# Patient Record
Sex: FEMALE | Race: OTHER | Employment: FULL TIME | URBAN - METROPOLITAN AREA
[De-identification: names, ages, dates, MRNs, and addresses within clinical notes are randomized per-mention and may not be internally consistent; named-entity substitution may affect disease eponyms.]

---

## 2024-10-14 ENCOUNTER — OFFICE VISIT (OUTPATIENT)
Dept: URGENT CARE | Facility: CLINIC | Age: 49
End: 2024-10-14
Payer: COMMERCIAL

## 2024-10-14 VITALS
SYSTOLIC BLOOD PRESSURE: 158 MMHG | RESPIRATION RATE: 18 BRPM | HEART RATE: 67 BPM | WEIGHT: 125 LBS | OXYGEN SATURATION: 100 % | BODY MASS INDEX: 21.34 KG/M2 | HEIGHT: 64 IN | DIASTOLIC BLOOD PRESSURE: 92 MMHG | TEMPERATURE: 97.9 F

## 2024-10-14 DIAGNOSIS — R42 DIZZINESS AND GIDDINESS: ICD-10-CM

## 2024-10-14 DIAGNOSIS — R20.2 PARESTHESIAS: ICD-10-CM

## 2024-10-14 DIAGNOSIS — R07.89 CHEST PRESSURE: Primary | ICD-10-CM

## 2024-10-14 LAB — GLUCOSE SERPL-MCNC: 102 MG/DL (ref 65–140)

## 2024-10-14 PROCEDURE — S9083 URGENT CARE CENTER GLOBAL: HCPCS | Performed by: NURSE PRACTITIONER

## 2024-10-14 PROCEDURE — G0382 LEV 3 HOSP TYPE B ED VISIT: HCPCS | Performed by: NURSE PRACTITIONER

## 2024-10-14 PROCEDURE — 82948 REAGENT STRIP/BLOOD GLUCOSE: CPT | Performed by: NURSE PRACTITIONER

## 2024-10-14 NOTE — PROGRESS NOTES
[facility]        NAME: Adilene Reddy is a 49 y.o. female  : 1975    MRN: [id number]  DATE: 2024  TIME: 11:06 AM    Assessment and Plan   Chest pressure [R07.89]  1. Chest pressure        2. Dizziness and giddiness        3. Paresthesias          Blood sugar in office was 102.  EKG done in office which states normal sinus rhythm with nonspecific ST and T wave abnormalities.  Referred to emergency room for further evaluation and workup.  Patient declines EMS states her son will drive her.  Son is with her in the waiting room.  She will be going to [facility] as it is the closest 1.    Patient Instructions     Follow up with PCP in 3-5 days.  Proceed to  ER if symptoms worsen.    Chief Complaint     Chief Complaint   Patient presents with    Dizziness     Felt weak, lightheaded, pressure in chest. Was fine this morning, then symptoms started at work at around 0800. Had a high blood pressure when checked at work. Tongue, hands and feet felt tingly.         History of Present Illness   Adilene Reddy presents to the clinic c/o    Dizziness (Felt weak, lightheaded, pressure in chest. Was fine this morning, then symptoms started at work at around 0800.  She states she was working on her computer when she felt pressure in her chest she felt tightness in her throat.  And she felt like she was going to pass out so she put her head down between her legs and her symptoms resolved.  They took her into a.m. care where they noted she had a high blood pressure when checked at work. Tongue, hands and feet felt tingly.)  Patient states she always eats breakfast at 915 so this is nothing out of the ordinary for her.  Patient states she continues to feel pressure on and off in her chest.  Continues with numbness and tingling in her hands her feet and her tongue.  Dizziness at times.  She does not have a family doctor.  She states she had something similar in  and was seen in the ER  "and was told her heart was normal at that time.  She states that was related to anxiety however she has been  and she states she does not have any stress right now in her life.  She states work is going well and has a great 18-year-old son who is not causing any problems.  She states she is overall happy.        Review of Systems   Review of Systems   All other systems reviewed and are negative.        Current Medications     No long-term medications on file.       Current Allergies     Allergies as of 10/14/2024    (No Known Allergies)            The following portions of the patient's history were reviewed and updated as appropriate: allergies, current medications, past family history, past medical history, past social history, past surgical history and problem list.    Objective   /92   Pulse 67   Temp 97.9 °F (36.6 °C) (Tympanic)   Resp 18   Ht 5' 4\" (1.626 m)   Wt 56.7 kg (125 lb)   SpO2 100%   BMI 21.46 kg/m²        Physical Exam     Physical Exam  Vitals and nursing note reviewed.   Constitutional:       Appearance: Normal appearance. She is well-developed.   HENT:      Head: Normocephalic and atraumatic.      Right Ear: Hearing, tympanic membrane, ear canal and external ear normal.      Left Ear: Hearing, tympanic membrane, ear canal and external ear normal.      Nose: Nose normal.      Mouth/Throat:      Lips: Pink.      Mouth: Mucous membranes are moist.      Pharynx: Oropharynx is clear.   Eyes:      General: Lids are normal.      Conjunctiva/sclera: Conjunctivae normal.      Pupils: Pupils are equal, round, and reactive to light.   Cardiovascular:      Rate and Rhythm: Normal rate and regular rhythm.      Heart sounds: Normal heart sounds, S1 normal and S2 normal.   Pulmonary:      Effort: Pulmonary effort is normal.      Breath sounds: Normal breath sounds.   Abdominal:      General: Abdomen is flat. Bowel sounds are normal.      Palpations: Abdomen is soft.   Musculoskeletal:       "   General: Normal range of motion.      Cervical back: Full passive range of motion without pain, normal range of motion and neck supple.   Skin:     General: Skin is warm and dry.   Neurological:      General: No focal deficit present.      Mental Status: She is alert and oriented to person, place, and time.   Psychiatric:         Mood and Affect: Mood normal.         Speech: Speech normal.         Behavior: Behavior normal. Behavior is cooperative.         Thought Content: Thought content normal.         Judgment: Judgment normal.

## 2024-10-14 NOTE — PATIENT INSTRUCTIONS
"Proceed to ER -   Patient Education     Chest pain - Discharge instructions   The Basics   Written by the doctors and editors at [facility], [place]   What are discharge instructions? -- Discharge instructions are information about how to take care of yourself after getting medical care for a health problem.  What is chest pain? -- Different things can cause chest pain or discomfort. Many of these are not serious. Having chest pain does not necessarily mean that you are having a heart attack. But chest pain can be a sign of a heart attack.  Doctors use the word \"angina\" for chest pain or discomfort that is caused by a condition called \"coronary artery disease.\" In this disease, fatty deposits build up in the arteries that supply the heart with blood (figure 1). Chest pain can also be caused by a spasm in 1 of these arteries (figure 2).  When 1 of these things happens, the arteries narrow, and the heart muscle does not get enough blood. This causes chest pain or discomfort, especially during physical activity. People with angina often need to take medicines to help prevent or relieve chest pain.  In many cases, chest pain is not related to a heart problem. It can be caused by a lung problem, or something less serious such as muscle pain, heartburn, or anxiety.  How do I care for myself at home? -- Ask the doctor or nurse what you should do when you go home. Make sure that you understand exactly what you need to do to care for yourself. Ask questions if there is anything you do not understand.  You should also:   Know how to take nitroglycerin pills or spray, if your doctor prescribed them:   Rest. Sit or lie down while you take the medicine.   Spray the nitroglycerin under your tongue, or place 1 pill under your tongue and let it melt.   If the pain is not better or is getting worse after 5 minutes, call for emergency help. Then, take 1 more spray or pill under your tongue.   If the pain does not get better after another 3 to 5 " "minutes, take a third spray or pill under your tongue.   Drink a sip of water if your mouth is too dry for the pills to melt.   Carry your nitroglycerin pills or spray with you at all times. Check often to make sure that they are not . Keep your nitroglycerin in the original bottle and at room temperature. The pill should burn or tingle when you put it under your tongue. If it does not, you might need to replace your medicine. You should also replace the nitroglycerin pills or spray 6 to 12 months after you open the bottle.   If you have aspirin at home, chew and swallow a 325 mg tablet. The aspirin should not have an \"enteric coating\" (also called a \"safety coating\") or it will not dissolve fast enough.   Take all of your medicines as instructed, even if you feel well. Check with your doctor before taking any new medicines or supplements.   Keep your blood pressure and cholesterol under control. If you have diabetes, make sure that you know how to manage it.   Quit smoking, if you smoke. Your doctor or nurse can help.   Try to keep a healthy weight. If you have excess body weight, it can help to lose weight. Your doctor or nurse can help you do this in a healthy way.   Eat a healthy diet. Talk with your doctor or nurse about how to add healthy foods to your diet.   Go to \"heart rehab,\" if you doctor recommends it. This is an important part of your care. Share your discharge information with the rehab staff so they can plan a program to help you recover. Let your doctor know if you need help finding a program.  When should I call the doctor? -- Call for emergency help right away (in the [place] and [place], call ) if you think that you might be having a heart attack.  Symptoms of a heart attack can include (figure 3):   Severe chest pain, pressure, or discomfort with:   Breathing trouble, sweating, upset stomach, or cold and clammy skin   Pain in your arms, back, or jaw   Worse pain with activity like walking " up stairs   Fast or irregular heartbeat   Feeling dizzy, faint, or weak  Call your doctor for advice if:   You have a fever of 100.4°F (38°C) or higher, or chills.   You cough up yellow or green mucus.   You are more tired than normal, or have more trouble breathing with activity.  All topics are updated as new evidence becomes available and our peer review process is complete.  This topic retrieved from [employer] on: Mar 13, 2024.  Topic 540422 Version 2.0  Release: 32.2.4 - C32.71  © 2024 UpToDate, Inc. and/or its affiliates. All rights reserved.  figure 1: Coronary heart disease     In people with coronary heart disease, the coronary arteries get clogged with fatty deposits called plaques.  Graphic 94476 Version 5.0  figure 2: Coronary artery spasm     The coronary arteries supply blood and oxygen to the heart muscle. When a coronary artery spasms, the blood vessel narrows, and the heart muscle does not get enough blood.  Graphic 813325 Version 1.0  figure 3: Heart attack symptoms     This picture shows the main symptoms of a heart attack. People who are having a heart attack often have only some of these symptoms. The pain, pressure, and discomfort caused by a heart attack mostly affect the left side of the body, but can also affect the right.  Women are more likely than men to have to have symptoms other than chest pain. But chest pain or discomfort is the most common symptom of a heart attack in both women and men.  If you think that you are having a heart attack, call for an ambulance (in the [place] and [place], call 9-1-1). Do not try to get yourself to the hospital.  Graphic 69275 Version 4.0  Consumer Information Use and Disclaimer   Disclaimer: This generalized information is a limited summary of diagnosis, treatment, and/or medication information. It is not meant to be comprehensive and should be used as a tool to help the user understand and/or assess potential diagnostic and treatment options. It does NOT  include all information about conditions, treatments, medications, side effects, or risks that may apply to a specific patient. It is not intended to be medical advice or a substitute for the medical advice, diagnosis, or treatment of a health care provider based on the health care provider's examination and assessment of a patient's specific and unique circumstances. Patients must speak with a health care provider for complete information about their health, medical questions, and treatment options, including any risks or benefits regarding use of medications. This information does not endorse any treatments or medications as safe, effective, or approved for treating a specific patient. Up[employer]Date, Inc. and its affiliates disclaim any warranty or liability relating to this information or the use thereof.The use of this information is governed by the Terms of Use, available at https://www.w[employer]ters[employer]igerT[employer]uwer.com/en/know/clinical-effectiveness-terms. 2024© UpToDate, Inc. and its affiliates and/or licensors. All rights reserved.  Copyright   © 2024 Up[employer]Date, Inc. and/or its affiliates. All rights reserved.